# Patient Record
Sex: FEMALE | Race: OTHER
[De-identification: names, ages, dates, MRNs, and addresses within clinical notes are randomized per-mention and may not be internally consistent; named-entity substitution may affect disease eponyms.]

---

## 2021-01-09 ENCOUNTER — HOSPITAL ENCOUNTER (INPATIENT)
Dept: HOSPITAL 62 - LC | Age: 26
LOS: 2 days | Discharge: HOME | End: 2021-01-11
Attending: OBSTETRICS & GYNECOLOGY | Admitting: OBSTETRICS & GYNECOLOGY
Payer: COMMERCIAL

## 2021-01-09 DIAGNOSIS — Z3A.38: ICD-10-CM

## 2021-01-09 LAB
ADD MANUAL DIFF: NO
APPEARANCE UR: (no result)
APTT PPP: YELLOW S
BARBITURATES UR QL SCN: NEGATIVE
BASOPHILS # BLD AUTO: 0.1 10^3/UL (ref 0–0.2)
BASOPHILS NFR BLD AUTO: 0.5 % (ref 0–2)
BILIRUB UR QL STRIP: NEGATIVE
EOSINOPHIL # BLD AUTO: 0.3 10^3/UL (ref 0–0.6)
EOSINOPHIL NFR BLD AUTO: 2.4 % (ref 0–6)
ERYTHROCYTE [DISTWIDTH] IN BLOOD BY AUTOMATED COUNT: 12.5 % (ref 11.5–14)
GLUCOSE UR STRIP-MCNC: NEGATIVE MG/DL
HCT VFR BLD CALC: 34.9 % (ref 36–47)
HGB BLD-MCNC: 11.9 G/DL (ref 12–15.5)
KETONES UR STRIP-MCNC: NEGATIVE MG/DL
LYMPHOCYTES # BLD AUTO: 1.8 10^3/UL (ref 0.5–4.7)
LYMPHOCYTES NFR BLD AUTO: 15.7 % (ref 13–45)
MCH RBC QN AUTO: 30.4 PG (ref 27–33.4)
MCHC RBC AUTO-ENTMCNC: 34.2 G/DL (ref 32–36)
MCV RBC AUTO: 89 FL (ref 80–97)
METHADONE UR QL SCN: NEGATIVE
MONOCYTES # BLD AUTO: 0.7 10^3/UL (ref 0.1–1.4)
MONOCYTES NFR BLD AUTO: 6.2 % (ref 3–13)
NEUTROPHILS # BLD AUTO: 8.4 10^3/UL (ref 1.7–8.2)
NEUTS SEG NFR BLD AUTO: 75.2 % (ref 42–78)
NITRITE UR QL STRIP: NEGATIVE
PCP UR QL SCN: NEGATIVE
PH UR STRIP: 5 [PH] (ref 5–9)
PLATELET # BLD: 159 10^3/UL (ref 150–450)
PROT UR STRIP-MCNC: NEGATIVE MG/DL
RBC # BLD AUTO: 3.93 10^6/UL (ref 3.72–5.28)
SP GR UR STRIP: 1.02
TOTAL CELLS COUNTED % (AUTO): 100 %
URINE AMPHETAMINES SCREEN: NEGATIVE
URINE BENZODIAZEPINES SCREEN: NEGATIVE
URINE COCAINE SCREEN: NEGATIVE
URINE MARIJUANA (THC) SCREEN: NEGATIVE
UROBILINOGEN UR-MCNC: NEGATIVE MG/DL (ref ?–2)
WBC # BLD AUTO: 11.2 10^3/UL (ref 4–10.5)

## 2021-01-09 PROCEDURE — 36415 COLL VENOUS BLD VENIPUNCTURE: CPT

## 2021-01-09 PROCEDURE — 0HQ9XZZ REPAIR PERINEUM SKIN, EXTERNAL APPROACH: ICD-10-PCS | Performed by: OBSTETRICS & GYNECOLOGY

## 2021-01-09 PROCEDURE — 81005 URINALYSIS: CPT

## 2021-01-09 PROCEDURE — 85027 COMPLETE CBC AUTOMATED: CPT

## 2021-01-09 PROCEDURE — 86900 BLOOD TYPING SEROLOGIC ABO: CPT

## 2021-01-09 PROCEDURE — 94760 N-INVAS EAR/PLS OXIMETRY 1: CPT

## 2021-01-09 PROCEDURE — 86592 SYPHILIS TEST NON-TREP QUAL: CPT

## 2021-01-09 PROCEDURE — 85025 COMPLETE CBC W/AUTO DIFF WBC: CPT

## 2021-01-09 PROCEDURE — 80307 DRUG TEST PRSMV CHEM ANLYZR: CPT

## 2021-01-09 PROCEDURE — 86901 BLOOD TYPING SEROLOGIC RH(D): CPT

## 2021-01-09 PROCEDURE — 86850 RBC ANTIBODY SCREEN: CPT

## 2021-01-09 RX ADMIN — SENNOSIDES, DOCUSATE SODIUM SCH EACH: 50; 8.6 TABLET, FILM COATED ORAL at 10:17

## 2021-01-09 RX ADMIN — IBUPROFEN SCH MG: 800 TABLET, FILM COATED ORAL at 17:47

## 2021-01-09 RX ADMIN — DOCUSATE SODIUM SCH MG: 100 CAPSULE, LIQUID FILLED ORAL at 10:17

## 2021-01-09 RX ADMIN — FERROUS SULFATE TAB 325 MG (65 MG ELEMENTAL FE) SCH MG: 325 (65 FE) TAB at 17:46

## 2021-01-09 RX ADMIN — FERROUS SULFATE TAB 325 MG (65 MG ELEMENTAL FE) SCH MG: 325 (65 FE) TAB at 10:17

## 2021-01-09 RX ADMIN — Medication SCH CAP: at 10:18

## 2021-01-09 RX ADMIN — Medication SCH ML: at 22:33

## 2021-01-09 RX ADMIN — DOCUSATE SODIUM SCH MG: 100 CAPSULE, LIQUID FILLED ORAL at 17:46

## 2021-01-09 NOTE — BIRTH CERTIFICATE DATA
=================================================================

Birth Cert Data

=================================================================

Datetime Report Generated by CPN: 2021 10:29

   

   

=================================================================

BIRTH CERTIFICATE DATA

=================================================================

   

Delivery Provider:  Bruce Miguel MD    (2021 05:22:Bruce Miguel MD (MYRNA))

 47a. Prenatal Care:  Yes    (2021 05:22:Elizabeth Sanchez RN)

 47b. Date of First Visit:  2020 00:00    (2021

   05:22:Amelia Ponce RN)

 47c. Date of Last Visit:  2021 00:00    (2021

   05:22:Amelia Ponce RN)

 47d. Number of Prenatal Visits:  6    (2021 05:22:Chioma Callahan RN)

 48a. Number of Prev Live Births:  0    (2021 05:22:Chioma Callahan RN)

 48b. Now Livin    (2021 05:22:Chioma Callahan RN)

 48c. Live Births Now Dead:  0    (2021 05:22:QS system process)

 48e. Pregnancy Losses:  1    (2021 05:22:Chioma Callahan RN)

   

=================================================================

RISK FACTORS IN THIS PREGNANCY

=================================================================

   

 49a. Diabetes:  No    (2021 05:22:Elizabeth Sanchez RN)

 49b. Hypertension:  No    (2021 05:22:Elizabeth Sanchez RN)

 49c. Previous  Births:  0    (2021 05:22:Chioma Callahan RN)

 49d. Stillborns:  No    (2021 05:22:Chioma Callahan RN)

 49d. IUGR:  No    (2021 05:22:Elizabeth Sanchez RN)

 49e. Infertility Treatment:  No    (2021 05:22:Elizabeth Sanchez RN)

 49f. Previous Cesareans:  0    (2021 05:22:Chioma Callahan RN)

   

=================================================================

Mother's Height

=================================================================

   

 50b. Height Inches:  62    (2021 09:02:QS system process)

   

=================================================================

Mother's Weight 

=================================================================

   

 51a. Pre-Pregnancy Weight (lbs):  103    (2021 05:22:Chioma Callahan RN)

 51b. Weight at Delivery (lbs):  139    (2021 09:02:QS system

   process)

 52. Dt Last Normal Menses Began:  2020 00:00    (2021

   05:22:Chioma Callahan RN)

   

=================================================================

Infections Present/Treated

=================================================================

   

 53a. Gonorrhea:  No    (2021 05:22:Elizabeth Sanchez RN)

 Results this Hospital Visit :  Negative    (2021 05:22:Amelia Ponce RN)

 53b. Syphilis:  No    (2021 05:22:Elizabeth Sanchez RN)

 53c. Chlamydia:  No    (2021 05:22:Elizabeth Sanchez RN)

 Results this Hospital Visit:  Negative    (2021 05:22:Amelia Ponce RN)

 53d. Hepatitis B:  No    (2021 05:22:Elizabeth Sanchez RN)

 Results this Hospital Visit:  Negative    (2021 05:22:Amelia Ponce RN)

 53e. Hepatitis C:  Negative    (2021 05:22:Chioma Callahan RN)

 53h. Mother Tested for HBsAG:  Yes    (2021 05:22:Amelia Ponce RN)

 53i. Date Tested:  2020 00:00    (2021 05:22:Amelia Ponce RN)

 53j. Test Result:  Negative    (2021 05:22:Amelia Ponce RN)

   

=================================================================

Obstetric Procedures 

=================================================================

   

 54a, b, c. Obstetric Procedures:  Ultrasound    (2021

   05:22:Chioma Callahan RN)

   

=================================================================

Cigarette Smoking 

=================================================================

   

 Cigarette Smoking:  Never Smoker. [id number]    (2021

   05:22:Elizabeth Sanchez RN)

   

=================================================================

Onset of Labor

=================================================================

   

 56a. PROM >12 Hrs:  0.97    (2021 05:22:QS system process)

 56b. Precipitous Labor <3 Hrs:  9    (2021 05:22:QS system

   process)

 56c. Prolonged Labor > 20 Hrs:  9    (2021 05:22:QS system

   process)

   

=================================================================



=================================================================

   

 57a. Induction of Labor:  N/A    (2021 05:22:Elizabeth Sanchez RN)

 57c. Non-Vertex Presentation A:  Vertex    (2021 05:22:Mary Drew RN)

 57d. Steroids - Fetal Lung Mat:  None    (2021 05:22:Elizabeth Sancehz RN)

 57d. Steroids - Fetal Lung Mat:  Not Applicable    (2021

   05:22:Elizabeth Sanchez RN)

 57e. Antibiotics During Labor:  2021 06:19    (2021

   05:22:Chioma Callahan RN)

 57f. Mat Chorio or Temp >100.4:  98.3    (2021 05:22:Chioma Callahan RN)

 57g. Moderate/Heavy Meconium:  Clear    (2021 06:49:Elizabeth Sanchez RN)

 57h. Fetal Intolerance of Labor:  N/A    (2021 05:22:Chioma Callahan RN)

:  N/A    (2021 05:22:Chioma Callahan RN)

 57i. Epidural/Spinal Anesthesia:  None    (2021 05:22:Elizabeth Sanchez RN)

   

=================================================================

Method of Delivery

=================================================================

   

 58a. Forceps - Unsuccessful A:  N/A    (2021 05:22:Mary Drew RN)

 58b. Vacuum - Unsuccessful A:  N/A    (2021 05:22:Mary Drew RN)

   

=================================================================

58c. Presentation at Birth

=================================================================

   

 58c. Presentation at Birth - A :  Vertex    (2021 05:22:Mary Drew RN)

 58c. Presentation at Birth - A :  N/A    (2021 05:22:Mary Drew RN)

 58c. Presentation at Birth - A :  Cephalic    (2021

   05:41:Elizabeth Sanchez RN)

   

=================================================================

Final Route and Method of Del 

=================================================================

   

 58d. Baby A Route/Delivery:  Vaginal    (2021 07:47:Mary Drew RN)

 58e. Trial of Labor Attempted:  No    (2021 05:22:Elizabeth Sanchez RN)

 58e. Trial of Labor Attempted A:  N/A    (2021 05:22:Elizabeth Sanchez RN)

 58e. Trial of Labor Attempted B:  N/A    (2021 05:22:Elizabeth Sanchez RN)

   

=================================================================

Maternal Morbidity

=================================================================

   

 59b. 3rd or 4th Degree Lacs:  Vaginal    (2021 05:22:Bruce Miguel MD ([facility]))

   

=================================================================



=================================================================

   

 Birthweight Baby A:  3133    (2021 05:22:Nasimanayely Romero, RN)

 60a. Pounds :  6    (2021 05:22:QS system process)

 60b. Ounces:  15    (2021 05:22:QS system process)

   

=================================================================

61. GA at Delivery 

=================================================================

   

 Baby A:  38.4    (2021 05:22:Mary Drew RN)

:  Early Term- 37- 38.6 Weeks    (2021 05:22:QS system process)

   

=================================================================

62a. APGAR 5 Minute

=================================================================

   

 Baby A:  9    (2021 05:22:QS system process)

## 2021-01-09 NOTE — DELIVERY SUMMARY
=================================================================

Del Sum A-C

=================================================================

Datetime Report Generated by CPN: 2021 10:29

   

   

=================================================================

DELIVERY PERSONNEL

=================================================================

   

DELIVERY PERSONNEL:  [id number]

Delivery Doctor::  Bruce Miguel MD

Labor and Delivery Nurse::  Chioma Callahan RN

Labor and Delivery Nurse::  Mary Drew RN

Nursery Nurse::  Nasima Romero RN

Scrub Tech/CNA:  Brooke Erwin, ST

   

=================================================================

MATERNAL INFORMATION

=================================================================

   

Delivery Anesthesia:  None

Medications After Delivery:  Pitocin 10 Units IM; Pitocin 30 Units in

   500ml NS/D5W

Delivery QBL:  50

Maternal Complications:  None

   

=================================================================

LABOR SUMMARY

=================================================================

   

EDC:  2021 00:00

No. Babies in Womb:  1

 Attempted:  No

Labor Anesthesia:  None

   

=================================================================

LABOR INFORMATION

=================================================================

   

Reason for Induction:  Not Applicable

Onset of Labor:  2021 22:30

Complete Dilatation:  2021 06:45

Oxytocin:  N/A

Group B Beta Strep:  positive

Antibiotics # of Doses:  1

Antibiotics Time of Last Dose:  2021 06:19

Name of Antibiotic Given:  PCN

Steroids Given:  None

Reason Steroids Not Administered:  Not Applicable

   

=================================================================

MEMBRANES

=================================================================

   

Membranes Rupture Method:  Spontaneous

Rupture of Membranes:  2021 06:49

Length of Rupture (hr):  0.97

Amniotic Fluid Color:  Clear

Amniotic Fluid Amount:  Small

Amniotic Fluid Odor:  Normal

   

=================================================================

STAGES OF LABOR

=================================================================

   

Stage 1 hr:  8

Stage 1 min:  15

Stage 2 hr:  1

Stage 2 min:  2

Stage 3 hr:  0

Stage 3 min:  4

Total Time in Labor hr:  9

Total Time in Labor min:  21

   

=================================================================

VAGINAL DELIVERY

=================================================================

   

Episiotomy:  None

Laceration #1:  Vaginal

Laceration Extension #1:  First Degree

Laceration Repair:  Yes

Sponge Count Correct:  Yes

Sharps Count Correct:  Yes

   

=================================================================

CSECTION DELIVERY

=================================================================

   

Primary Indication:  N/A

Secondary Indication:  N/A

CSection Incidence:  N/A

Labor:  N/A

Elective:  N/A

CSection Incision:  N/A

   

=================================================================

BABY A INFORMATION

=================================================================

   

Infant Delivery Date/Time:  2021 07:47

Method of Delivery:  Vaginal

Nurse Controlled Delivery:  No

Born in Route :  No

:  N/A

Forceps:  N/A

Vacuum Extraction:  N/A

Shoulder Dystocia :  No

   

=================================================================

PRESENTATION/POSITION BABY A

=================================================================

   

Presentation:  Cephalic

Cephalic Presentation:  Vertex

Vertex Position:  Right Occipital Anterior

Breech Presentation:  N/A

   

=================================================================

PLACENTA INFORMATION BABY A

=================================================================

   

Placenta Delivery Time :  2021 07:51

Placenta Method of Delivery:  Spontaneous

Placenta Status:  Delivered

   

=================================================================

APGAR SCORES BABY A

=================================================================

   

Heart Rate 1 min:  >100 bpm

Resp Effort 1 min:  Good Cry

Reflex Irritability 1 min:  Cough or Sneeze or Pulls Away

Muscle Tone 1 min:  Active Motion

Color 1 min:  Body Pink, Extremities Blue

Resuscitation Effort 1 min:  Tactile Stimulation

APGAR SCORE 1 MIN:  9

Heart Rate 5 min:  >100 bpm

Resp Effort 5 min:  Good Cry

Reflex Irritability 5 min:  Cough or Sneeze or Pulls Away

Muscle Tone 5 min:  Active Motion

Color 5 min:  Body Pink, Extremities Blue

Resuscitation Effort 5 min:  N/A

APGAR SCORE 5 MIN:  9

   

=================================================================

INFANT INFORMATION BABY A

=================================================================

   

Gestational Age at Delivery:  38.4

Gestational Status:  Early Term- 37- 38.6 Weeks

Infant Outcome :  Liveborn

Infant Condition :  Stable

Infant Sex:  Male

   

=================================================================

IDENTIFICATION BABY A

=================================================================

   

Infant Verification Date/Time:  2021 08:35

ID Band Number:  [id number]

Mother's Name Verified:  Yes

Infant Medical Record Number:  [id number]

RN Verifying Infant:  CAydin Callahan, RN

Additional Verifying Personnel:  DAydin Wilbert, CNA II/ US

   

=================================================================

WEIGHT/LENGTH BABY A

=================================================================

   

Infant Birthweight (gm):  3133

Infant Weight (lb):  6

Infant Weight (oz):  15

Infant Length (in):  19.50

Infant Length (cm):  49.53

   

=================================================================

CORD INFORMATION BABY A

=================================================================

   

No. Cord Vessels:  3

Nuchal Cord :  Around Neck x1, Loose

Cord Blood Taken:  Yes-For Eval (Mom's Blood Type - or O+)

Infant Suction:  None

   

=================================================================

ASSESSMENT BABY A

=================================================================

   

Infant Complications:  None

Physical Findings at Delivery:  Within Normal Limits

Skin to Skin:  Yes

Skin to Skin Time (min):  60

Infant Care By:  DANK Romero RN

Transferred To:  Remains with Mother

   

=================================================================

BABY B INFORMATION

=================================================================

   

 :  N/A

   

=================================================================

SIGNATURES

=================================================================

   

Signature:  Electronically signed by Bruce Miguel MD ([employer]) on

   2021 at 07:56  with User ID: [email or web address]

## 2021-01-10 LAB
ERYTHROCYTE [DISTWIDTH] IN BLOOD BY AUTOMATED COUNT: 12.9 % (ref 11.5–14)
HCT VFR BLD CALC: 30 % (ref 36–47)
HGB BLD-MCNC: 10.4 G/DL (ref 12–15.5)
MCH RBC QN AUTO: 30.9 PG (ref 27–33.4)
MCHC RBC AUTO-ENTMCNC: 34.5 G/DL (ref 32–36)
MCV RBC AUTO: 90 FL (ref 80–97)
PLATELET # BLD: 152 10^3/UL (ref 150–450)
RBC # BLD AUTO: 3.35 10^6/UL (ref 3.72–5.28)
WBC # BLD AUTO: 11.4 10^3/UL (ref 4–10.5)

## 2021-01-10 RX ADMIN — DOCUSATE SODIUM SCH MG: 100 CAPSULE, LIQUID FILLED ORAL at 10:35

## 2021-01-10 RX ADMIN — SENNOSIDES, DOCUSATE SODIUM SCH EACH: 50; 8.6 TABLET, FILM COATED ORAL at 10:35

## 2021-01-10 RX ADMIN — IBUPROFEN SCH MG: 800 TABLET, FILM COATED ORAL at 01:56

## 2021-01-10 RX ADMIN — IBUPROFEN SCH MG: 800 TABLET, FILM COATED ORAL at 18:54

## 2021-01-10 RX ADMIN — Medication SCH: at 18:51

## 2021-01-10 RX ADMIN — FERROUS SULFATE TAB 325 MG (65 MG ELEMENTAL FE) SCH MG: 325 (65 FE) TAB at 10:35

## 2021-01-10 RX ADMIN — DOCUSATE SODIUM SCH MG: 100 CAPSULE, LIQUID FILLED ORAL at 18:53

## 2021-01-10 RX ADMIN — FERROUS SULFATE TAB 325 MG (65 MG ELEMENTAL FE) SCH MG: 325 (65 FE) TAB at 18:54

## 2021-01-10 RX ADMIN — Medication SCH ML: at 05:15

## 2021-01-10 RX ADMIN — IBUPROFEN SCH MG: 800 TABLET, FILM COATED ORAL at 10:35

## 2021-01-10 NOTE — PDOC PROGRESS REPORT
Subjective-OB


Progress Note for:: 01/10/21


Subjective: 


reports bleeding slowing, pain controlled with current meds. denies need





Physical Exam (OB)


Vital Signs: 


                                        











Temp Pulse Resp BP Pulse Ox


 


 98.1 F   60   16   91/54 L  100 


 


 01/10/21 07:20  01/10/21 07:20  01/10/21 07:20  01/10/21 07:20  01/10/21 07:20








                                 Intake & Output











 01/09/21 01/10/21 01/11/21





 06:59 06:59 06:59


 


Intake Total  900 


 


Balance  900 


 


Weight  62.5 kg 














- Maternal Morbidity


59. Maternal Morbidity (serious complications experinced by the mother 

associated with labor and delivery: None of the above





- Abdomen


Description: Soft


Hernia Present: No


Fundal Description: Firm, Midline


Fundal Height: u/u - u/2





- Abdominal


Distension: No distension


Tenderness: Nontender





- Extremities


Lower extremities: Oksana's sign - neg


Calf: Normal, Nontender





Objective-Diagnostic


Laboratory: 


                                        





                                 01/10/21 05:42 





                                        











  01/10/21





  05:42


 


WBC  11.4 H


 


RBC  3.35 L


 


Hgb  10.4 L


 


Hct  30.0 L


 


MCV  90


 


MCH  30.9


 


MCHC  34.5


 


RDW  12.9


 


Plt Count  152














Assessment and Plan(PN)





- Time Spent with Patient


Time with patient: Less than 15 minutes





- Disposition


Anticipated Discharge Disposition: Home, Self Care


Anticipated Discharge Timeframe: within 24 hours

## 2021-01-11 VITALS — DIASTOLIC BLOOD PRESSURE: 57 MMHG | SYSTOLIC BLOOD PRESSURE: 99 MMHG

## 2021-01-11 RX ADMIN — FERROUS SULFATE TAB 325 MG (65 MG ELEMENTAL FE) SCH MG: 325 (65 FE) TAB at 10:07

## 2021-01-11 RX ADMIN — IBUPROFEN SCH MG: 800 TABLET, FILM COATED ORAL at 01:37

## 2021-01-11 RX ADMIN — Medication SCH: at 07:30

## 2021-01-11 RX ADMIN — IBUPROFEN SCH MG: 800 TABLET, FILM COATED ORAL at 10:06

## 2021-01-11 RX ADMIN — DOCUSATE SODIUM SCH MG: 100 CAPSULE, LIQUID FILLED ORAL at 10:06

## 2021-01-11 RX ADMIN — Medication SCH: at 01:37

## 2021-01-11 RX ADMIN — Medication SCH CAP: at 10:06

## 2021-01-11 RX ADMIN — SENNOSIDES, DOCUSATE SODIUM SCH EACH: 50; 8.6 TABLET, FILM COATED ORAL at 10:07

## 2021-01-11 NOTE — PDOC DISCHARGE SUMMARY
Impression





- Admit/DC Date/PCP


Admission Date/Primary Care Provider: 


  01/09/21 05:59





  





Discharge Date: 01/11/21 - PP day #2, doing well, no complaints, O+, rubella 

immune breastfeeding





- Discharge Diagnosis


(1) Active labor


Is this a current diagnosis for this admission?: Yes   





(2) Normal vaginal delivery


Is this a current diagnosis for this admission?: Yes   





(3) Obstetrical laceration, first degree


Is this a current diagnosis for this admission?: Yes   





- Additional Information


Resuscitation Status: Full Code


Discharge Diet: As Tolerated, Regular


Discharge Activity: Activity As Tolerated, No Lifting Over 10 Pounds, Pelvic 

Rest


Prescriptions: 


Ibuprofen [Motrin 800 mg Tablet] 800 mg PO Q8A #60 tablet


Home Medications: 








Prenatal Vits96/Iron Fum/Folic [Prenatal Tablet] 1 each PO DAILY 01/09/21 


Ibuprofen [Motrin 800 mg Tablet] 800 mg PO Q8A #60 tablet 01/11/21 











HPI


Reason(s) for Admission: Onset of Labor


Prenatal Procedures: Ultrasound


Intrapartum Procedure(s): Spontaneous Vaginal Delivery


Postpartum Complication(s): Laceration-Vaginal


Laceration-Degree: 1st





Hospital Course


59. Maternal Morbidity (serious complications experinced by the mother 

associated with labor and delivery: None of the above





Results


Laboratory Results: 


                                        











WBC  11.4 10^3/uL (4.0-10.5)  H  01/10/21  05:42    


 


RBC  3.35 10^6/uL (3.72-5.28)  L  01/10/21  05:42    


 


Hgb  10.4 g/dL (12.0-15.5)  L  01/10/21  05:42    


 


Hct  30.0 % (36.0-47.0)  L  01/10/21  05:42    


 


MCV  90 fl (80-97)   01/10/21  05:42    


 


MCH  30.9 pg (27.0-33.4)   01/10/21  05:42    


 


MCHC  34.5 g/dL (32.0-36.0)   01/10/21  05:42    


 


RDW  12.9 % (11.5-14.0)   01/10/21  05:42    


 


Plt Count  152 10^3/uL (150-450)   01/10/21  05:42    


 


Lymph % (Auto)  15.7 % (13-45)   01/09/21  06:18    


 


Mono % (Auto)  6.2 % (3-13)   01/09/21  06:18    


 


Eos % (Auto)  2.4 % (0-6)   01/09/21  06:18    


 


Baso % (Auto)  0.5 % (0-2)   01/09/21  06:18    


 


Absolute Neuts (auto)  8.4 10^3/uL (1.7-8.2)  H  01/09/21  06:18    


 


Absolute Lymphs (auto)  1.8 10^3/uL (0.5-4.7)   01/09/21  06:18    


 


Absolute Monos (auto)  0.7 10^3/uL (0.1-1.4)   01/09/21  06:18    


 


Absolute Eos (auto)  0.3 10^3/uL (0.0-0.6)   01/09/21  06:18    


 


Absolute Basos (auto)  0.1 10^3/uL (0.0-0.2)   01/09/21  06:18    


 


Seg Neutrophils %  75.2 % (42-78)   01/09/21  06:18    


 


Urine Color  YELLOW   01/09/21  05:20    


 


Urine Appearance  SLIGHTLY-CLOUDY   01/09/21  05:20    


 


Urine pH  5.0  (5.0-9.0)   01/09/21  05:20    


 


Ur Specific Gravity  1.024   01/09/21  05:20    


 


Urine Protein  NEGATIVE mg/dL (NEGATIVE)   01/09/21  05:20    


 


Urine Glucose (UA)  NEGATIVE mg/dL (NEGATIVE)   01/09/21  05:20    


 


Urine Ketones  NEGATIVE mg/dL (NEGATIVE)   01/09/21  05:20    


 


Urine Blood  NEGATIVE  (NEGATIVE)   01/09/21  05:20    


 


Urine Nitrite  NEGATIVE  (NEGATIVE)   01/09/21  05:20    


 


Urine Bilirubin  NEGATIVE  (NEGATIVE)   01/09/21  05:20    


 


Urine Urobilinogen  NEGATIVE mg/dL (<2.0)   01/09/21  05:20    


 


Ur Leukocyte Esterase  NEGATIVE  (NEGATIVE)   01/09/21  05:20    


 


Urine Ascorbic Acid  NEGATIVE  (NEGATIVE)   01/09/21  05:20    


 


Urine Opiates Screen  NEGATIVE   01/09/21  05:20    


 


Urine Methadone Screen  NEGATIVE   01/09/21  05:20    


 


Ur Barbiturates Screen  NEGATIVE   01/09/21  05:20    


 


Ur Phencyclidine Scrn  NEGATIVE   01/09/21  05:20    


 


Ur Amphetamines Screen  NEGATIVE   01/09/21  05:20    


 


U Benzodiazepines Scrn  NEGATIVE   01/09/21  05:20    


 


Urine Cocaine Screen  NEGATIVE   01/09/21  05:20    


 


U Marijuana (THC) Screen  NEGATIVE   01/09/21  05:20    


 


Blood Type  O POSITIVE   01/09/21  06:18    


 


Antibody Screen  NEGATIVE   01/09/21  06:18    














Plan


Plan of Treatment: 


d/c home,.F/up with [facility] in 4 wks for PP  check


Time Spent: Less than 30 Minutes